# Patient Record
Sex: MALE | Race: OTHER | HISPANIC OR LATINO | URBAN - METROPOLITAN AREA
[De-identification: names, ages, dates, MRNs, and addresses within clinical notes are randomized per-mention and may not be internally consistent; named-entity substitution may affect disease eponyms.]

---

## 2023-03-13 ENCOUNTER — INPATIENT (INPATIENT)
Facility: HOSPITAL | Age: 53
LOS: 3 days | Discharge: HOME | DRG: 392 | End: 2023-03-17
Attending: SURGERY | Admitting: SURGERY
Payer: MEDICAID

## 2023-03-13 VITALS
TEMPERATURE: 98 F | SYSTOLIC BLOOD PRESSURE: 158 MMHG | OXYGEN SATURATION: 95 % | DIASTOLIC BLOOD PRESSURE: 99 MMHG | HEART RATE: 100 BPM | RESPIRATION RATE: 16 BRPM | WEIGHT: 195.99 LBS

## 2023-03-13 DIAGNOSIS — K57.20 DIVERTICULITIS OF LARGE INTESTINE WITH PERFORATION AND ABSCESS WITHOUT BLEEDING: ICD-10-CM

## 2023-03-13 DIAGNOSIS — K57.92 DIVERTICULITIS OF INTESTINE, PART UNSPECIFIED, WITHOUT PERFORATION OR ABSCESS WITHOUT BLEEDING: ICD-10-CM

## 2023-03-13 LAB
ALBUMIN SERPL ELPH-MCNC: 4 G/DL — SIGNIFICANT CHANGE UP (ref 3.5–5)
ALP SERPL-CCNC: 100 U/L — SIGNIFICANT CHANGE UP (ref 40–120)
ALT FLD-CCNC: 37 U/L DA — SIGNIFICANT CHANGE UP (ref 10–60)
ANION GAP SERPL CALC-SCNC: 5 MMOL/L — SIGNIFICANT CHANGE UP (ref 5–17)
APPEARANCE UR: CLEAR — SIGNIFICANT CHANGE UP
APTT BLD: 30.4 SEC — SIGNIFICANT CHANGE UP (ref 27.5–35.5)
AST SERPL-CCNC: 23 U/L — SIGNIFICANT CHANGE UP (ref 10–40)
BACTERIA # UR AUTO: ABNORMAL /HPF
BASOPHILS # BLD AUTO: 0.05 K/UL — SIGNIFICANT CHANGE UP (ref 0–0.2)
BASOPHILS NFR BLD AUTO: 0.3 % — SIGNIFICANT CHANGE UP (ref 0–2)
BILIRUB SERPL-MCNC: 0.9 MG/DL — SIGNIFICANT CHANGE UP (ref 0.2–1.2)
BILIRUB UR-MCNC: NEGATIVE — SIGNIFICANT CHANGE UP
BLD GP AB SCN SERPL QL: SIGNIFICANT CHANGE UP
BUN SERPL-MCNC: 11 MG/DL — SIGNIFICANT CHANGE UP (ref 7–18)
CALCIUM SERPL-MCNC: 9.5 MG/DL — SIGNIFICANT CHANGE UP (ref 8.4–10.5)
CHLORIDE SERPL-SCNC: 106 MMOL/L — SIGNIFICANT CHANGE UP (ref 96–108)
CO2 SERPL-SCNC: 25 MMOL/L — SIGNIFICANT CHANGE UP (ref 22–31)
COLOR SPEC: YELLOW — SIGNIFICANT CHANGE UP
CREAT SERPL-MCNC: 1.18 MG/DL — SIGNIFICANT CHANGE UP (ref 0.5–1.3)
DIFF PNL FLD: ABNORMAL
EGFR: 74 ML/MIN/1.73M2 — SIGNIFICANT CHANGE UP
EOSINOPHIL # BLD AUTO: 0.13 K/UL — SIGNIFICANT CHANGE UP (ref 0–0.5)
EOSINOPHIL NFR BLD AUTO: 0.8 % — SIGNIFICANT CHANGE UP (ref 0–6)
EPI CELLS # UR: SIGNIFICANT CHANGE UP /HPF
GLUCOSE SERPL-MCNC: 125 MG/DL — HIGH (ref 70–99)
GLUCOSE UR QL: NEGATIVE — SIGNIFICANT CHANGE UP
HCT VFR BLD CALC: 45 % — SIGNIFICANT CHANGE UP (ref 39–50)
HGB BLD-MCNC: 15.5 G/DL — SIGNIFICANT CHANGE UP (ref 13–17)
IMM GRANULOCYTES NFR BLD AUTO: 0.9 % — SIGNIFICANT CHANGE UP (ref 0–0.9)
INR BLD: 1.12 RATIO — SIGNIFICANT CHANGE UP (ref 0.88–1.16)
KETONES UR-MCNC: NEGATIVE — SIGNIFICANT CHANGE UP
LEUKOCYTE ESTERASE UR-ACNC: NEGATIVE — SIGNIFICANT CHANGE UP
LIDOCAIN IGE QN: 141 U/L — SIGNIFICANT CHANGE UP (ref 73–393)
LYMPHOCYTES # BLD AUTO: 1.88 K/UL — SIGNIFICANT CHANGE UP (ref 1–3.3)
LYMPHOCYTES # BLD AUTO: 11.8 % — LOW (ref 13–44)
MCHC RBC-ENTMCNC: 29.6 PG — SIGNIFICANT CHANGE UP (ref 27–34)
MCHC RBC-ENTMCNC: 34.4 GM/DL — SIGNIFICANT CHANGE UP (ref 32–36)
MCV RBC AUTO: 85.9 FL — SIGNIFICANT CHANGE UP (ref 80–100)
MONOCYTES # BLD AUTO: 1.36 K/UL — HIGH (ref 0–0.9)
MONOCYTES NFR BLD AUTO: 8.5 % — SIGNIFICANT CHANGE UP (ref 2–14)
NEUTROPHILS # BLD AUTO: 12.43 K/UL — HIGH (ref 1.8–7.4)
NEUTROPHILS NFR BLD AUTO: 77.7 % — HIGH (ref 43–77)
NITRITE UR-MCNC: NEGATIVE — SIGNIFICANT CHANGE UP
NRBC # BLD: 0 /100 WBCS — SIGNIFICANT CHANGE UP (ref 0–0)
PH UR: 7 — SIGNIFICANT CHANGE UP (ref 5–8)
PLATELET # BLD AUTO: 309 K/UL — SIGNIFICANT CHANGE UP (ref 150–400)
POTASSIUM SERPL-MCNC: 3.6 MMOL/L — SIGNIFICANT CHANGE UP (ref 3.5–5.3)
POTASSIUM SERPL-SCNC: 3.6 MMOL/L — SIGNIFICANT CHANGE UP (ref 3.5–5.3)
PROT SERPL-MCNC: 7.9 G/DL — SIGNIFICANT CHANGE UP (ref 6–8.3)
PROT UR-MCNC: 30 MG/DL
PROTHROM AB SERPL-ACNC: 13.4 SEC — SIGNIFICANT CHANGE UP (ref 10.5–13.4)
RBC # BLD: 5.24 M/UL — SIGNIFICANT CHANGE UP (ref 4.2–5.8)
RBC # FLD: 12.7 % — SIGNIFICANT CHANGE UP (ref 10.3–14.5)
RBC CASTS # UR COMP ASSIST: SIGNIFICANT CHANGE UP /HPF (ref 0–2)
SARS-COV-2 RNA SPEC QL NAA+PROBE: SIGNIFICANT CHANGE UP
SODIUM SERPL-SCNC: 136 MMOL/L — SIGNIFICANT CHANGE UP (ref 135–145)
SP GR SPEC: 1.01 — SIGNIFICANT CHANGE UP (ref 1.01–1.02)
UROBILINOGEN FLD QL: NEGATIVE — SIGNIFICANT CHANGE UP
WBC # BLD: 16 K/UL — HIGH (ref 3.8–10.5)
WBC # FLD AUTO: 16 K/UL — HIGH (ref 3.8–10.5)
WBC UR QL: SIGNIFICANT CHANGE UP /HPF (ref 0–5)

## 2023-03-13 PROCEDURE — 99285 EMERGENCY DEPT VISIT HI MDM: CPT

## 2023-03-13 PROCEDURE — 74177 CT ABD & PELVIS W/CONTRAST: CPT | Mod: 26,MA

## 2023-03-13 RX ORDER — PIPERACILLIN AND TAZOBACTAM 4; .5 G/20ML; G/20ML
3.38 INJECTION, POWDER, LYOPHILIZED, FOR SOLUTION INTRAVENOUS ONCE
Refills: 0 | Status: DISCONTINUED | OUTPATIENT
Start: 2023-03-13 | End: 2023-03-13

## 2023-03-13 RX ORDER — ONDANSETRON 8 MG/1
4 TABLET, FILM COATED ORAL EVERY 6 HOURS
Refills: 0 | Status: DISCONTINUED | OUTPATIENT
Start: 2023-03-13 | End: 2023-03-17

## 2023-03-13 RX ORDER — PIPERACILLIN AND TAZOBACTAM 4; .5 G/20ML; G/20ML
3.38 INJECTION, POWDER, LYOPHILIZED, FOR SOLUTION INTRAVENOUS EVERY 8 HOURS
Refills: 0 | Status: DISCONTINUED | OUTPATIENT
Start: 2023-03-14 | End: 2023-03-17

## 2023-03-13 RX ORDER — MORPHINE SULFATE 50 MG/1
2 CAPSULE, EXTENDED RELEASE ORAL EVERY 4 HOURS
Refills: 0 | Status: DISCONTINUED | OUTPATIENT
Start: 2023-03-13 | End: 2023-03-17

## 2023-03-13 RX ORDER — DEXTROSE MONOHYDRATE, SODIUM CHLORIDE, AND POTASSIUM CHLORIDE 50; .745; 4.5 G/1000ML; G/1000ML; G/1000ML
1000 INJECTION, SOLUTION INTRAVENOUS
Refills: 0 | Status: DISCONTINUED | OUTPATIENT
Start: 2023-03-13 | End: 2023-03-17

## 2023-03-13 RX ORDER — MORPHINE SULFATE 50 MG/1
4 CAPSULE, EXTENDED RELEASE ORAL ONCE
Refills: 0 | Status: DISCONTINUED | OUTPATIENT
Start: 2023-03-13 | End: 2023-03-13

## 2023-03-13 RX ORDER — HEPARIN SODIUM 5000 [USP'U]/ML
5000 INJECTION INTRAVENOUS; SUBCUTANEOUS EVERY 8 HOURS
Refills: 0 | Status: DISCONTINUED | OUTPATIENT
Start: 2023-03-13 | End: 2023-03-17

## 2023-03-13 RX ORDER — PIPERACILLIN AND TAZOBACTAM 4; .5 G/20ML; G/20ML
3.38 INJECTION, POWDER, LYOPHILIZED, FOR SOLUTION INTRAVENOUS ONCE
Refills: 0 | Status: COMPLETED | OUTPATIENT
Start: 2023-03-13 | End: 2023-03-13

## 2023-03-13 RX ORDER — SODIUM CHLORIDE 9 MG/ML
1000 INJECTION INTRAMUSCULAR; INTRAVENOUS; SUBCUTANEOUS ONCE
Refills: 0 | Status: COMPLETED | OUTPATIENT
Start: 2023-03-13 | End: 2023-03-13

## 2023-03-13 RX ADMIN — PIPERACILLIN AND TAZOBACTAM 3.38 GRAM(S): 4; .5 INJECTION, POWDER, LYOPHILIZED, FOR SOLUTION INTRAVENOUS at 21:17

## 2023-03-13 RX ADMIN — HEPARIN SODIUM 5000 UNIT(S): 5000 INJECTION INTRAVENOUS; SUBCUTANEOUS at 22:02

## 2023-03-13 RX ADMIN — SODIUM CHLORIDE 1000 MILLILITER(S): 9 INJECTION INTRAMUSCULAR; INTRAVENOUS; SUBCUTANEOUS at 17:01

## 2023-03-13 RX ADMIN — PIPERACILLIN AND TAZOBACTAM 200 GRAM(S): 4; .5 INJECTION, POWDER, LYOPHILIZED, FOR SOLUTION INTRAVENOUS at 20:48

## 2023-03-13 RX ADMIN — MORPHINE SULFATE 4 MILLIGRAM(S): 50 CAPSULE, EXTENDED RELEASE ORAL at 16:27

## 2023-03-13 RX ADMIN — MORPHINE SULFATE 4 MILLIGRAM(S): 50 CAPSULE, EXTENDED RELEASE ORAL at 15:57

## 2023-03-13 RX ADMIN — SODIUM CHLORIDE 1000 MILLILITER(S): 9 INJECTION INTRAMUSCULAR; INTRAVENOUS; SUBCUTANEOUS at 16:01

## 2023-03-13 NOTE — H&P ADULT - NSHPPHYSICALEXAM_GEN_ALL_CORE
T(C): 37.1 (03-13-23 @ 16:47), Max: 37.1 (03-13-23 @ 16:47)  HR: 83 (03-13-23 @ 16:47) (82 - 100)  BP: 138/86 (03-13-23 @ 16:47) (126/75 - 158/99)  RR: 16 (03-13-23 @ 16:47) (16 - 17)  SpO2: 96% (03-13-23 @ 16:47) (95% - 97%)    CONSTITUTIONAL: Well groomed, no apparent distress  EYES: PERRLA and symmetric, EOMI, No conjunctival or scleral injection, non-icteric  NECK: Supple, symmetric and without tracheal deviation   RESP: No respiratory distress, no use of accessory muscles; CTA b/l, no WRR  CV: RRR, +S1S2, no MRG  GI: Soft, focal mild LLQ tenderness, ND, no rebound, no guarding  MSK: Normal gait; No digital clubbing or cyanosis  SKIN: No rashes or ulcers noted; no subcutaneous nodules or induration palpable  PSYCH: Appropriate insight/judgment; A+O x 3, mood and affect appropriate, recent/remote memory intact

## 2023-03-13 NOTE — ED PROVIDER NOTE - CLINICAL SUMMARY MEDICAL DECISION MAKING FREE TEXT BOX
52-year-old male, presents for evaluation of lower abdominal pain x2 days.  On exam nontoxic appearance, hemodynamically stable, afebrile.  Abdomen is distended with generalized tenderness.  CT shows significant inflammation from sigmoid diverticulitis with microperforation.  No abscess.  Will start on Zosyn, surgery consulted and plan to admit to the hospital.

## 2023-03-13 NOTE — ED ADULT NURSE NOTE - OBJECTIVE STATEMENT
Pt presents to the ED with c/o lower abdominal pain and chills since yesterday. Denies fever, nausea, vomiting, or diarrhea.

## 2023-03-13 NOTE — ED PROVIDER NOTE - OBJECTIVE STATEMENT
52-year-old male, history of ?diverticulitis, presents for evaluation of lower abdominal pain since yesterday.  Gradual onset of bilateral lower abdominal pain that is continuous and has become worse today.  Associated with feeling bloated and subjective fever.  Bilateral knee joint pain.  Reports that has had similar symptoms in the past 1 year ago but unsure of the diagnosis.  Denies any urinary symptoms, rash, chest pain, shortness of breath, dizziness or any other complaint.

## 2023-03-13 NOTE — ED PROVIDER NOTE - PROGRESS NOTE DETAILS
CT report reviewed and discussed with patient. Surgery consult requested (spoke to Dr Krishnan). Will start Zosyn. Plan TBA.

## 2023-03-13 NOTE — ED PROVIDER NOTE - NS ED ATTENDING STATEMENT MOD
This was a shared visit with the MARYCHUY. I reviewed and verified the documentation and independently performed the documented:

## 2023-03-13 NOTE — H&P ADULT - HISTORY OF PRESENT ILLNESS
51 y/o male denies any sig PMH or PSH  never had colonoscopy  possible previous bout of diverticulitis; pt unsure    c/o lower abd pain L>R x 1 day  feeling better in ED now  +bloating with PO intake, subjective fever  no chills, n/v    < from: CT Abdomen and Pelvis w/ IV Cont (03.13.23 @ 19:55) >  FINDINGS:  LOWER CHEST: Motion artifact. Left lower lobe scarring. 7 mm right lower   lobe calcified granuloma.    LIVER: Mild steatosis. Subcentimeter hepatic dome hypodensity too small   to characterize.  BILE DUCTS:Normal caliber.  GALLBLADDER: Within normal limits.  SPLEEN: Within normal limits.  PANCREAS: Within normal limits.  ADRENALS: Within normal limits.  KIDNEYS/URETERS: Within normal limits.    BLADDER: Within normal limits.  REPRODUCTIVE ORGANS: Prostate within normal limits.    BOWEL: No bowel obstruction. Mild colonic stool burden. Sigmoid   diverticulosis. 12 cm length segment of thick-walled sigmoid colon with   pericolonic fat stranding consistent with diverticulitis. No intramural   or intra-abdominal abscess. Tiny droplet of extraluminal gas proximally   (3:94) consistent with microperforation. Appendix is normal.  PERITONEUM: Small ascites tracking down the left paracolic gutter into   the pelvis. No pneumoperitoneum.  VESSELS: Within normal limits.  RETROPERITONEUM/LYMPH NODES: No lymphadenopathy.  ABDOMINAL WALL: Within normal limits.  BONES: Within normal limits.    IMPRESSION:  Moderate to severe sigmoid diverticulitis with small focus of   microperforation proximally. No remote pneumoperitoneum. No intramural or   intra-abdominal abscess. Recommend follow-up to resolution and follow-up   colonoscopy to exclude underlying mass..    < end of copied text >

## 2023-03-14 LAB
ANION GAP SERPL CALC-SCNC: 6 MMOL/L — SIGNIFICANT CHANGE UP (ref 5–17)
BUN SERPL-MCNC: 9 MG/DL — SIGNIFICANT CHANGE UP (ref 7–18)
CALCIUM SERPL-MCNC: 8.5 MG/DL — SIGNIFICANT CHANGE UP (ref 8.4–10.5)
CHLORIDE SERPL-SCNC: 104 MMOL/L — SIGNIFICANT CHANGE UP (ref 96–108)
CO2 SERPL-SCNC: 26 MMOL/L — SIGNIFICANT CHANGE UP (ref 22–31)
CREAT SERPL-MCNC: 1.09 MG/DL — SIGNIFICANT CHANGE UP (ref 0.5–1.3)
EGFR: 82 ML/MIN/1.73M2 — SIGNIFICANT CHANGE UP
GLUCOSE SERPL-MCNC: 152 MG/DL — HIGH (ref 70–99)
HCT VFR BLD CALC: 41.7 % — SIGNIFICANT CHANGE UP (ref 39–50)
HGB BLD-MCNC: 13.7 G/DL — SIGNIFICANT CHANGE UP (ref 13–17)
MCHC RBC-ENTMCNC: 29.4 PG — SIGNIFICANT CHANGE UP (ref 27–34)
MCHC RBC-ENTMCNC: 32.9 GM/DL — SIGNIFICANT CHANGE UP (ref 32–36)
MCV RBC AUTO: 89.5 FL — SIGNIFICANT CHANGE UP (ref 80–100)
NRBC # BLD: 0 /100 WBCS — SIGNIFICANT CHANGE UP (ref 0–0)
PLATELET # BLD AUTO: 256 K/UL — SIGNIFICANT CHANGE UP (ref 150–400)
POTASSIUM SERPL-MCNC: 3.7 MMOL/L — SIGNIFICANT CHANGE UP (ref 3.5–5.3)
POTASSIUM SERPL-SCNC: 3.7 MMOL/L — SIGNIFICANT CHANGE UP (ref 3.5–5.3)
RBC # BLD: 4.66 M/UL — SIGNIFICANT CHANGE UP (ref 4.2–5.8)
RBC # FLD: 12.8 % — SIGNIFICANT CHANGE UP (ref 10.3–14.5)
SODIUM SERPL-SCNC: 136 MMOL/L — SIGNIFICANT CHANGE UP (ref 135–145)
WBC # BLD: 12.54 K/UL — HIGH (ref 3.8–10.5)
WBC # FLD AUTO: 12.54 K/UL — HIGH (ref 3.8–10.5)

## 2023-03-14 RX ADMIN — HEPARIN SODIUM 5000 UNIT(S): 5000 INJECTION INTRAVENOUS; SUBCUTANEOUS at 06:19

## 2023-03-14 RX ADMIN — HEPARIN SODIUM 5000 UNIT(S): 5000 INJECTION INTRAVENOUS; SUBCUTANEOUS at 13:35

## 2023-03-14 RX ADMIN — MORPHINE SULFATE 2 MILLIGRAM(S): 50 CAPSULE, EXTENDED RELEASE ORAL at 02:09

## 2023-03-14 RX ADMIN — DEXTROSE MONOHYDRATE, SODIUM CHLORIDE, AND POTASSIUM CHLORIDE 125 MILLILITER(S): 50; .745; 4.5 INJECTION, SOLUTION INTRAVENOUS at 11:35

## 2023-03-14 RX ADMIN — DEXTROSE MONOHYDRATE, SODIUM CHLORIDE, AND POTASSIUM CHLORIDE 125 MILLILITER(S): 50; .745; 4.5 INJECTION, SOLUTION INTRAVENOUS at 01:47

## 2023-03-14 RX ADMIN — PIPERACILLIN AND TAZOBACTAM 25 GRAM(S): 4; .5 INJECTION, POWDER, LYOPHILIZED, FOR SOLUTION INTRAVENOUS at 13:32

## 2023-03-14 RX ADMIN — HEPARIN SODIUM 5000 UNIT(S): 5000 INJECTION INTRAVENOUS; SUBCUTANEOUS at 21:39

## 2023-03-14 RX ADMIN — PIPERACILLIN AND TAZOBACTAM 25 GRAM(S): 4; .5 INJECTION, POWDER, LYOPHILIZED, FOR SOLUTION INTRAVENOUS at 21:39

## 2023-03-14 RX ADMIN — DEXTROSE MONOHYDRATE, SODIUM CHLORIDE, AND POTASSIUM CHLORIDE 125 MILLILITER(S): 50; .745; 4.5 INJECTION, SOLUTION INTRAVENOUS at 21:39

## 2023-03-14 RX ADMIN — MORPHINE SULFATE 2 MILLIGRAM(S): 50 CAPSULE, EXTENDED RELEASE ORAL at 01:39

## 2023-03-14 RX ADMIN — ONDANSETRON 4 MILLIGRAM(S): 8 TABLET, FILM COATED ORAL at 01:38

## 2023-03-14 NOTE — PROGRESS NOTE ADULT - SUBJECTIVE AND OBJECTIVE BOX
Pt c/o llq pain, stable    ICU Vital Signs Last 24 Hrs  T(C): 36.7 (14 Mar 2023 06:07), Max: 37.1 (13 Mar 2023 16:47)  T(F): 98.1 (14 Mar 2023 06:07), Max: 98.8 (13 Mar 2023 16:47)  HR: 65 (14 Mar 2023 06:07) (65 - 100)  BP: 134/82 (14 Mar 2023 06:07) (105/64 - 158/99)  BP(mean): 73 (14 Mar 2023 01:35) (73 - 73)  ABP: --  ABP(mean): --  RR: 18 (14 Mar 2023 06:07) (16 - 18)  SpO2: 96% (14 Mar 2023 06:07) (94% - 98%)    O2 Parameters below as of 14 Mar 2023 06:07  Patient On (Oxygen Delivery Method): room air        alert nad  Abd: soft +llq tenderness, voluntary guarding no rebound                        15.5   16.00 )-----------( 309      ( 13 Mar 2023 15:44 )             45.0   03-13    136  |  106  |  11  ----------------------------<  125<H>  3.6   |  25  |  1.18    Ca    9.5      13 Mar 2023 15:44    TPro  7.9  /  Alb  4.0  /  TBili  0.9  /  DBili  x   /  AST  23  /  ALT  37  /  AlkPhos  100  03-13

## 2023-03-15 LAB
ANION GAP SERPL CALC-SCNC: 5 MMOL/L — SIGNIFICANT CHANGE UP (ref 5–17)
BUN SERPL-MCNC: 9 MG/DL — SIGNIFICANT CHANGE UP (ref 7–18)
CALCIUM SERPL-MCNC: 8.9 MG/DL — SIGNIFICANT CHANGE UP (ref 8.4–10.5)
CHLORIDE SERPL-SCNC: 106 MMOL/L — SIGNIFICANT CHANGE UP (ref 96–108)
CO2 SERPL-SCNC: 26 MMOL/L — SIGNIFICANT CHANGE UP (ref 22–31)
CREAT SERPL-MCNC: 1.17 MG/DL — SIGNIFICANT CHANGE UP (ref 0.5–1.3)
EGFR: 75 ML/MIN/1.73M2 — SIGNIFICANT CHANGE UP
GLUCOSE SERPL-MCNC: 147 MG/DL — HIGH (ref 70–99)
HCT VFR BLD CALC: 41.9 % — SIGNIFICANT CHANGE UP (ref 39–50)
HGB BLD-MCNC: 13.8 G/DL — SIGNIFICANT CHANGE UP (ref 13–17)
MCHC RBC-ENTMCNC: 29.3 PG — SIGNIFICANT CHANGE UP (ref 27–34)
MCHC RBC-ENTMCNC: 32.9 GM/DL — SIGNIFICANT CHANGE UP (ref 32–36)
MCV RBC AUTO: 89 FL — SIGNIFICANT CHANGE UP (ref 80–100)
NRBC # BLD: 0 /100 WBCS — SIGNIFICANT CHANGE UP (ref 0–0)
PLATELET # BLD AUTO: 272 K/UL — SIGNIFICANT CHANGE UP (ref 150–400)
POTASSIUM SERPL-MCNC: 3.8 MMOL/L — SIGNIFICANT CHANGE UP (ref 3.5–5.3)
POTASSIUM SERPL-SCNC: 3.8 MMOL/L — SIGNIFICANT CHANGE UP (ref 3.5–5.3)
RBC # BLD: 4.71 M/UL — SIGNIFICANT CHANGE UP (ref 4.2–5.8)
RBC # FLD: 12.3 % — SIGNIFICANT CHANGE UP (ref 10.3–14.5)
SODIUM SERPL-SCNC: 137 MMOL/L — SIGNIFICANT CHANGE UP (ref 135–145)
WBC # BLD: 8.35 K/UL — SIGNIFICANT CHANGE UP (ref 3.8–10.5)
WBC # FLD AUTO: 8.35 K/UL — SIGNIFICANT CHANGE UP (ref 3.8–10.5)

## 2023-03-15 RX ADMIN — HEPARIN SODIUM 5000 UNIT(S): 5000 INJECTION INTRAVENOUS; SUBCUTANEOUS at 13:04

## 2023-03-15 RX ADMIN — PIPERACILLIN AND TAZOBACTAM 25 GRAM(S): 4; .5 INJECTION, POWDER, LYOPHILIZED, FOR SOLUTION INTRAVENOUS at 21:48

## 2023-03-15 RX ADMIN — PIPERACILLIN AND TAZOBACTAM 25 GRAM(S): 4; .5 INJECTION, POWDER, LYOPHILIZED, FOR SOLUTION INTRAVENOUS at 13:04

## 2023-03-15 RX ADMIN — PIPERACILLIN AND TAZOBACTAM 25 GRAM(S): 4; .5 INJECTION, POWDER, LYOPHILIZED, FOR SOLUTION INTRAVENOUS at 05:03

## 2023-03-15 RX ADMIN — HEPARIN SODIUM 5000 UNIT(S): 5000 INJECTION INTRAVENOUS; SUBCUTANEOUS at 05:03

## 2023-03-15 NOTE — CONSULT NOTE ADULT - ASSESSMENT
Acute diverticulitis with microperforation  Leukocytosis - normalized    Plan - Cont Zosyn 3.375gmsiv q8hrs  repeat CT abdomen/ pelvis with oral contrast tomorrow   if perforation sealed will advance diet and plan to DC home on Ceftin / Flagyl po in 1-2 days.

## 2023-03-15 NOTE — PROGRESS NOTE ADULT - SUBJECTIVE AND OBJECTIVE BOX
INTERVAL HPI/OVERNIGHT EVENTS:  Pt resting comfortably in bed. No acute overnight events. Reports mild LLQ pain that is improved since time of presentation. C/o hunger. Denies nausea, vomiting, fevers, chills.         MEDICATIONS  (STANDING):  dextrose 5% + sodium chloride 0.45% with potassium chloride 20 mEq/L 1000 milliLiter(s) (125 mL/Hr) IV Continuous <Continuous>  heparin   Injectable 5000 Unit(s) SubCutaneous every 8 hours  piperacillin/tazobactam IVPB.. 3.375 Gram(s) IV Intermittent every 8 hours    MEDICATIONS  (PRN):  morphine  - Injectable 2 milliGRAM(s) IV Push every 4 hours PRN Moderate Pain (4 - 6)  ondansetron Injectable 4 milliGRAM(s) IV Push every 6 hours PRN Nausea and/or Vomiting      Vital Signs Last 24 Hrs  T(C): 36.5 (15 Mar 2023 05:50), Max: 36.7 (14 Mar 2023 21:21)  T(F): 97.7 (15 Mar 2023 05:50), Max: 98 (14 Mar 2023 21:21)  HR: 53 (15 Mar 2023 05:50) (53 - 73)  BP: 108/60 (15 Mar 2023 05:50) (108/60 - 112/68)  BP(mean): 72 (15 Mar 2023 05:50) (72 - 72)  RR: 16 (15 Mar 2023 05:50) (16 - 16)  SpO2: 95% (15 Mar 2023 05:50) (95% - 98%)    Parameters below as of 15 Mar 2023 05:50  Patient On (Oxygen Delivery Method): room air        Physical:  General: A&Ox3. NAD.  Respiratory: Normal respiratory effort. Equal chest rise bilaterally.  Abdomen: Soft, nondistended, mild TTP in LLQ. No guarding, no rigidity, no rebound tenderness. No palpable masses.    I&O's Detail      LABS:                        13.8   8.35  )-----------( 272      ( 15 Mar 2023 07:00 )             41.9             03-15    137  |  106  |  9   ----------------------------<  147<H>  3.8   |  26  |  1.17    Ca    8.9      15 Mar 2023 07:00    TPro  7.9  /  Alb  4.0  /  TBili  0.9  /  DBili  x   /  AST  23  /  ALT  37  /  AlkPhos  100  03-13      RADIOLOGY  ACC: [id number] EXAM:  CT ABDOMEN AND PELVIS IC   ORDERED BY:  CHRISTINA VOSS     PROCEDURE DATE:  03/13/2023          INTERPRETATION:  CLINICAL INFORMATION: 52 years  Male with lower abdo   pain, r/o diverticulitis/perforation.    COMPARISON: None.    CONTRAST/COMPLICATIONS:  IV Contrast: Omnipaque 350  90 cc administered   10 cc discarded  Oral Contrast: NONE  Complications: None reported at time of study completion    PROCEDURE:  CT of the Abdomen and Pelvis was performed.  Sagittal and coronal reformats were performed.    FINDINGS:  LOWER CHEST: Motion artifact. Left lower lobe scarring. 7 mm right lower   lobe calcified granuloma.    LIVER: Mild steatosis. Subcentimeter hepatic dome hypodensity too small   to characterize.  BILE DUCTS:Normal caliber.  GALLBLADDER: Within normal limits.  SPLEEN: Within normal limits.  PANCREAS: Within normal limits.  ADRENALS: Within normal limits.  KIDNEYS/URETERS: Within normal limits.    BLADDER: Within normal limits.  REPRODUCTIVE ORGANS: Prostate within normal limits.    BOWEL: No bowel obstruction. Mild colonic stool burden. Sigmoid   diverticulosis. 12 cm length segment of thick-walled sigmoid colon with   pericolonic fat stranding consistent with diverticulitis. No intramural   or intra-abdominal abscess. Tiny droplet of extraluminal gas proximally   (3:94) consistent with microperforation. Appendix is normal.  PERITONEUM: Small ascites tracking down the left paracolic gutter into   the pelvis. No pneumoperitoneum.  VESSELS: Within normal limits.  RETROPERITONEUM/LYMPH NODES: No lymphadenopathy.  ABDOMINAL WALL: Within normal limits.  BONES: Within normal limits.    IMPRESSION:  Moderate to severe sigmoid diverticulitis with small focus of   microperforation proximally. No remote pneumoperitoneum. No intramural or   intra-abdominal abscess. Recommend follow-up to resolution and follow-up   colonoscopy to exclude underlying mass..    Findings were discussed with DAVID VOSS [phone] 3/13/2023 8:23   PM by Dr. Monse Mccormick with read back confirmation.    --- End of Report ---            MONSE MCCORMICK MD; Attending Radiologist  This document has been electronically signed. Mar 13 2023  8:26PM

## 2023-03-15 NOTE — CONSULT NOTE ADULT - SUBJECTIVE AND OBJECTIVE BOX
HPI:  53 y/o male denies any sig PMH or PSH  never had colonoscopy  possible previous bout of diverticulitis; pt unsure    c/o lower abd pain L>R x 1 day  feeling better in ED now  +bloating with PO intake, subjective fever  no chills, n/v    < from: CT Abdomen and Pelvis w/ IV Cont (23 @ 19:55) >  FINDINGS:  LOWER CHEST: Motion artifact. Left lower lobe scarring. 7 mm right lower   lobe calcified granuloma.    LIVER: Mild steatosis. Subcentimeter hepatic dome hypodensity too small   to characterize.  BILE DUCTS:Normal caliber.  GALLBLADDER: Within normal limits.  SPLEEN: Within normal limits.  PANCREAS: Within normal limits.  ADRENALS: Within normal limits.  KIDNEYS/URETERS: Within normal limits.    BLADDER: Within normal limits.  REPRODUCTIVE ORGANS: Prostate within normal limits.    BOWEL: No bowel obstruction. Mild colonic stool burden. Sigmoid   diverticulosis. 12 cm length segment of thick-walled sigmoid colon with   pericolonic fat stranding consistent with diverticulitis. No intramural   or intra-abdominal abscess. Tiny droplet of extraluminal gas proximally   (3:94) consistent with microperforation. Appendix is normal.  PERITONEUM: Small ascites tracking down the left paracolic gutter into   the pelvis. No pneumoperitoneum.  VESSELS: Within normal limits.  RETROPERITONEUM/LYMPH NODES: No lymphadenopathy.  ABDOMINAL WALL: Within normal limits.  BONES: Within normal limits.    IMPRESSION:  Moderate to severe sigmoid diverticulitis with small focus of   microperforation proximally. No remote pneumoperitoneum. No intramural or   intra-abdominal abscess. Recommend follow-up to resolution and follow-up   colonoscopy to exclude underlying mass..    < end of copied text >   (13 Mar 2023 21:39)      PAST MEDICAL & SURGICAL HISTORY:  No pertinent past medical history      No significant past surgical history          No Known Allergies      Meds:  dextrose 5% + sodium chloride 0.45% with potassium chloride 20 mEq/L 1000 milliLiter(s) IV Continuous <Continuous>  heparin   Injectable 5000 Unit(s) SubCutaneous every 8 hours  morphine  - Injectable 2 milliGRAM(s) IV Push every 4 hours PRN  ondansetron Injectable 4 milliGRAM(s) IV Push every 6 hours PRN  piperacillin/tazobactam IVPB.. 3.375 Gram(s) IV Intermittent every 8 hours      SOCIAL HISTORY:  Smoker:  YES / NO        PACK YEARS:                         WHEN QUIT?  ETOH use:  YES / NO               FREQUENCY / QUANTITY:  Ilicit Drug use:  YES / NO  Occupation:  Assisted device use (Cane / Walker):  Live with:    FAMILY HISTORY:      VITALS:  Vital Signs Last 24 Hrs  T(C): 36.6 (15 Mar 2023 13:14), Max: 36.7 (14 Mar 2023 21:21)  T(F): 97.9 (15 Mar 2023 13:14), Max: 98 (14 Mar 2023 21:21)  HR: 58 (15 Mar 2023 13:14) (53 - 63)  BP: 110/68 (15 Mar 2023 13:14) (108/60 - 112/68)  BP(mean): 72 (15 Mar 2023 05:50) (72 - 72)  RR: 18 (15 Mar 2023 13:14) (16 - 18)  SpO2: 97% (15 Mar 2023 13:14) (95% - 98%)    Parameters below as of 15 Mar 2023 13:14  Patient On (Oxygen Delivery Method): room air        LABS/DIAGNOSTIC TESTS:                          13.8   8.35  )-----------( 272      ( 15 Mar 2023 07:00 )             41.9     WBC Count: 8.35 K/uL (03-15 @ 07:00)  WBC Count: 12.54 K/uL ( @ 09:02)  WBC Count: 16.00 K/uL ( @ 15:44)      03-15    137  |  106  |  9   ----------------------------<  147<H>  3.8   |  26  |  1.17    Ca    8.9      15 Mar 2023 07:00    TPro  7.9  /  Alb  4.0  /  TBili  0.9  /  DBili  x   /  AST  23  /  ALT  37  /  AlkPhos  100        Urinalysis Basic - ( 13 Mar 2023 15:44 )    Color: Yellow / Appearance: Clear / S.010 / pH: x  Gluc: x / Ketone: Negative  / Bili: Negative / Urobili: Negative   Blood: x / Protein: 30 mg/dL / Nitrite: Negative   Leuk Esterase: Negative / RBC: 0-2 /HPF / WBC 0-2 /HPF   Sq Epi: x / Non Sq Epi: Few /HPF / Bacteria: Few /HPF        LIVER FUNCTIONS - ( 13 Mar 2023 15:44 )  Alb: 4.0 g/dL / Pro: 7.9 g/dL / ALK PHOS: 100 U/L / ALT: 37 U/L DA / AST: 23 U/L / GGT: x             PT/INR - ( 13 Mar 2023 15:44 )   PT: 13.4 sec;   INR: 1.12 ratio         PTT - ( 13 Mar 2023 15:44 )  PTT:30.4 sec    LACTATE:    ABG -     CULTURES:       RADIOLOGY:< from: CT Abdomen and Pelvis w/ IV Cont (23 @ 19:55) >  ACC: [id number] EXAM:  CT ABDOMEN AND PELVIS IC   ORDERED BY:  CHRISTINA VOSS     PROCEDURE DATE:  2023          INTERPRETATION:  CLINICAL INFORMATION: 52 years  Male with lower abdo   pain, r/o diverticulitis/perforation.    COMPARISON: None.    CONTRAST/COMPLICATIONS:  IV Contrast: Omnipaque 350  90 cc administered   10 cc discarded  Oral Contrast: NONE  Complications: None reported at time of study completion    PROCEDURE:  CT of the Abdomen and Pelvis was performed.  Sagittal and coronal reformats were performed.    FINDINGS:  LOWER CHEST: Motion artifact. Left lower lobe scarring. 7 mm right lower   lobe calcified granuloma.    LIVER: Mild steatosis. Subcentimeter hepatic dome hypodensity too small   to characterize.  BILE DUCTS:Normal caliber.  GALLBLADDER: Within normal limits.  SPLEEN: Within normal limits.  PANCREAS: Within normal limits.  ADRENALS: Within normal limits.  KIDNEYS/URETERS: Within normal limits.    BLADDER: Within normal limits.  REPRODUCTIVE ORGANS: Prostate within normal limits.    BOWEL: No bowel obstruction. Mild colonic stool burden. Sigmoid   diverticulosis. 12 cm length segment of thick-walled sigmoid colon with   pericolonic fat stranding consistent with diverticulitis. No intramural   or intra-abdominal abscess. Tiny droplet of extraluminal gas proximally   (3:94) consistent with microperforation. Appendix is normal.  PERITONEUM: Small ascites tracking down the left paracolic gutter into   the pelvis. No pneumoperitoneum.  VESSELS: Within normal limits.  RETROPERITONEUM/LYMPH NODES: No lymphadenopathy.  ABDOMINAL WALL: Within normal limits.  BONES: Within normal limits.    IMPRESSION:  Moderate to severe sigmoid diverticulitis with small focus of   microperforation proximally. No remote pneumoperitoneum. No intramural or   intra-abdominal abscess. Recommend follow-up to resolution and follow-up   colonoscopy to exclude underlying mass..    Findings were discussed with NP CHRISTINA VOSS [phone] 3/13/2023 8:23   PM by Dr. Monse Mccormick with read back confirmation.    --- End of Report ---            MONSE MCCORMICK MD; Attending Radiologist  This document has been electronically signed. Mar 13 2023  8:26PM    < end of copied text >        ROS  [  ] UNABLE TO ELICIT               HPI:  51 y/o male denies any sig PMH or PSH  never had colonoscopy  possible previous bout of diverticulitis; pt unsure    c/o lower abd pain L>R x 1 day  feeling better in ED now  +bloating with PO intake, subjective fever  no chills, n/v      History as above, asked to see this patient who presented with LLQ abdominal pain x 1 day, he denies any fevers , chills, nausea , vomiting or diarrhea, no urinary symptoms. He was found to have acute Diverticulitis with a microperforation and a small droplet of air here. He states that he is already  feeling better since being here.        PAST MEDICAL & SURGICAL HISTORY:  No pertinent past medical history      No significant past surgical history          No Known Allergies      Meds:  dextrose 5% + sodium chloride 0.45% with potassium chloride 20 mEq/L 1000 milliLiter(s) IV Continuous <Continuous>  heparin   Injectable 5000 Unit(s) SubCutaneous every 8 hours  morphine  - Injectable 2 milliGRAM(s) IV Push every 4 hours PRN  ondansetron Injectable 4 milliGRAM(s) IV Push every 6 hours PRN  piperacillin/tazobactam IVPB.. 3.375 Gram(s) IV Intermittent every 8 hours      SOCIAL HISTORY:  Smoker:  no  ETOH use:  no  Ilicit Drug use: no      FAMILY HISTORY: not contributory      VITALS:  Vital Signs Last 24 Hrs  T(C): 36.6 (15 Mar 2023 13:14), Max: 36.7 (14 Mar 2023 21:21)  T(F): 97.9 (15 Mar 2023 13:14), Max: 98 (14 Mar 2023 21:21)  HR: 58 (15 Mar 2023 13:14) (53 - 63)  BP: 110/68 (15 Mar 2023 13:14) (108/60 - 112/68)  BP(mean): 72 (15 Mar 2023 05:50) (72 - 72)  RR: 18 (15 Mar 2023 13:14) (16 - 18)  SpO2: 97% (15 Mar 2023 13:14) (95% - 98%)    Parameters below as of 15 Mar 2023 13:14  Patient On (Oxygen Delivery Method): room air        LABS/DIAGNOSTIC TESTS:                          13.8   8.35  )-----------( 272      ( 15 Mar 2023 07:00 )             41.9     WBC Count: 8.35 K/uL (03-15 @ 07:00)  WBC Count: 12.54 K/uL ( @ 09:02)  WBC Count: 16.00 K/uL ( @ 15:44)      03-15    137  |  106  |  9   ----------------------------<  147<H>  3.8   |  26  |  1.17    Ca    8.9      15 Mar 2023 07:00    TPro  7.9  /  Alb  4.0  /  TBili  0.9  /  DBili  x   /  AST  23  /  ALT  37  /  AlkPhos  100        Urinalysis Basic - ( 13 Mar 2023 15:44 )    Color: Yellow / Appearance: Clear / S.010 / pH: x  Gluc: x / Ketone: Negative  / Bili: Negative / Urobili: Negative   Blood: x / Protein: 30 mg/dL / Nitrite: Negative   Leuk Esterase: Negative / RBC: 0-2 /HPF / WBC 0-2 /HPF   Sq Epi: x / Non Sq Epi: Few /HPF / Bacteria: Few /HPF        LIVER FUNCTIONS - ( 13 Mar 2023 15:44 )  Alb: 4.0 g/dL / Pro: 7.9 g/dL / ALK PHOS: 100 U/L / ALT: 37 U/L DA / AST: 23 U/L / GGT: x             PT/INR - ( 13 Mar 2023 15:44 )   PT: 13.4 sec;   INR: 1.12 ratio         PTT - ( 13 Mar 2023 15:44 )  PTT:30.4 sec    LACTATE:    ABG -     CULTURES:       RADIOLOGY:< from: CT Abdomen and Pelvis w/ IV Cont (23 @ 19:55) >  ACC: [id number] EXAM:  CT ABDOMEN AND PELVIS IC   ORDERED BY:  CHRISTINA VOSS     PROCEDURE DATE:  2023          INTERPRETATION:  CLINICAL INFORMATION: 52 years  Male with lower abdo   pain, r/o diverticulitis/perforation.    COMPARISON: None.    CONTRAST/COMPLICATIONS:  IV Contrast: Omnipaque 350  90 cc administered   10 cc discarded  Oral Contrast: NONE  Complications: None reported at time of study completion    PROCEDURE:  CT of the Abdomen and Pelvis was performed.  Sagittal and coronal reformats were performed.    FINDINGS:  LOWER CHEST: Motion artifact. Left lower lobe scarring. 7 mm right lower   lobe calcified granuloma.    LIVER: Mild steatosis. Subcentimeter hepatic dome hypodensity too small   to characterize.  BILE DUCTS:Normal caliber.  GALLBLADDER: Within normal limits.  SPLEEN: Within normal limits.  PANCREAS: Within normal limits.  ADRENALS: Within normal limits.  KIDNEYS/URETERS: Within normal limits.    BLADDER: Within normal limits.  REPRODUCTIVE ORGANS: Prostate within normal limits.    BOWEL: No bowel obstruction. Mild colonic stool burden. Sigmoid   diverticulosis. 12 cm length segment of thick-walled sigmoid colon with   pericolonic fat stranding consistent with diverticulitis. No intramural   or intra-abdominal abscess. Tiny droplet of extraluminal gas proximally   (3:94) consistent with microperforation. Appendix is normal.  PERITONEUM: Small ascites tracking down the left paracolic gutter into   the pelvis. No pneumoperitoneum.  VESSELS: Within normal limits.  RETROPERITONEUM/LYMPH NODES: No lymphadenopathy.  ABDOMINAL WALL: Within normal limits.  BONES: Within normal limits.    IMPRESSION:  Moderate to severe sigmoid diverticulitis with small focus of   microperforation proximally. No remote pneumoperitoneum. No intramural or   intra-abdominal abscess. Recommend follow-up to resolution and follow-up   colonoscopy to exclude underlying mass..    Findings were discussed with NP CHRISTINA VOSS [phone] 3/13/2023 8:23   PM by Dr. Monse Mccormick with read back confirmation.    --- End of Report ---            MONSE MCCORMICK MD; Attending Radiologist  This document has been electronically signed. Mar 13 2023  8:26PM    < end of copied text >        ROS  [  ] UNABLE TO ELICIT

## 2023-03-16 LAB
ANION GAP SERPL CALC-SCNC: 7 MMOL/L — SIGNIFICANT CHANGE UP (ref 5–17)
ANION GAP SERPL CALC-SCNC: 9 MMOL/L — SIGNIFICANT CHANGE UP (ref 5–17)
BUN SERPL-MCNC: 12 MG/DL — SIGNIFICANT CHANGE UP (ref 7–18)
BUN SERPL-MCNC: 9 MG/DL — SIGNIFICANT CHANGE UP (ref 7–18)
CALCIUM SERPL-MCNC: 8.9 MG/DL — SIGNIFICANT CHANGE UP (ref 8.4–10.5)
CALCIUM SERPL-MCNC: 9.2 MG/DL — SIGNIFICANT CHANGE UP (ref 8.4–10.5)
CHLORIDE SERPL-SCNC: 104 MMOL/L — SIGNIFICANT CHANGE UP (ref 96–108)
CHLORIDE SERPL-SCNC: 105 MMOL/L — SIGNIFICANT CHANGE UP (ref 96–108)
CO2 SERPL-SCNC: 25 MMOL/L — SIGNIFICANT CHANGE UP (ref 22–31)
CO2 SERPL-SCNC: 26 MMOL/L — SIGNIFICANT CHANGE UP (ref 22–31)
CREAT SERPL-MCNC: 1.08 MG/DL — SIGNIFICANT CHANGE UP (ref 0.5–1.3)
CREAT SERPL-MCNC: 1.24 MG/DL — SIGNIFICANT CHANGE UP (ref 0.5–1.3)
EGFR: 70 ML/MIN/1.73M2 — SIGNIFICANT CHANGE UP
EGFR: 83 ML/MIN/1.73M2 — SIGNIFICANT CHANGE UP
GLUCOSE SERPL-MCNC: 105 MG/DL — HIGH (ref 70–99)
GLUCOSE SERPL-MCNC: 164 MG/DL — HIGH (ref 70–99)
HCT VFR BLD CALC: 41.6 % — SIGNIFICANT CHANGE UP (ref 39–50)
HGB BLD-MCNC: 14.2 G/DL — SIGNIFICANT CHANGE UP (ref 13–17)
MCHC RBC-ENTMCNC: 29.5 PG — SIGNIFICANT CHANGE UP (ref 27–34)
MCHC RBC-ENTMCNC: 34.1 GM/DL — SIGNIFICANT CHANGE UP (ref 32–36)
MCV RBC AUTO: 86.5 FL — SIGNIFICANT CHANGE UP (ref 80–100)
NRBC # BLD: 0 /100 WBCS — SIGNIFICANT CHANGE UP (ref 0–0)
PLATELET # BLD AUTO: 295 K/UL — SIGNIFICANT CHANGE UP (ref 150–400)
POTASSIUM SERPL-MCNC: 3.8 MMOL/L — SIGNIFICANT CHANGE UP (ref 3.5–5.3)
POTASSIUM SERPL-MCNC: 3.8 MMOL/L — SIGNIFICANT CHANGE UP (ref 3.5–5.3)
POTASSIUM SERPL-SCNC: 3.8 MMOL/L — SIGNIFICANT CHANGE UP (ref 3.5–5.3)
POTASSIUM SERPL-SCNC: 3.8 MMOL/L — SIGNIFICANT CHANGE UP (ref 3.5–5.3)
RBC # BLD: 4.81 M/UL — SIGNIFICANT CHANGE UP (ref 4.2–5.8)
RBC # FLD: 12.4 % — SIGNIFICANT CHANGE UP (ref 10.3–14.5)
SODIUM SERPL-SCNC: 136 MMOL/L — SIGNIFICANT CHANGE UP (ref 135–145)
SODIUM SERPL-SCNC: 140 MMOL/L — SIGNIFICANT CHANGE UP (ref 135–145)
WBC # BLD: 7.35 K/UL — SIGNIFICANT CHANGE UP (ref 3.8–10.5)
WBC # FLD AUTO: 7.35 K/UL — SIGNIFICANT CHANGE UP (ref 3.8–10.5)

## 2023-03-16 PROCEDURE — 74177 CT ABD & PELVIS W/CONTRAST: CPT | Mod: 26

## 2023-03-16 RX ORDER — SODIUM CHLORIDE 9 MG/ML
1000 INJECTION, SOLUTION INTRAVENOUS ONCE
Refills: 0 | Status: COMPLETED | OUTPATIENT
Start: 2023-03-16 | End: 2023-03-16

## 2023-03-16 RX ORDER — IOHEXOL 300 MG/ML
30 INJECTION, SOLUTION INTRAVENOUS ONCE
Refills: 0 | Status: COMPLETED | OUTPATIENT
Start: 2023-03-16 | End: 2023-03-16

## 2023-03-16 RX ADMIN — PIPERACILLIN AND TAZOBACTAM 25 GRAM(S): 4; .5 INJECTION, POWDER, LYOPHILIZED, FOR SOLUTION INTRAVENOUS at 13:23

## 2023-03-16 RX ADMIN — HEPARIN SODIUM 5000 UNIT(S): 5000 INJECTION INTRAVENOUS; SUBCUTANEOUS at 05:19

## 2023-03-16 RX ADMIN — HEPARIN SODIUM 5000 UNIT(S): 5000 INJECTION INTRAVENOUS; SUBCUTANEOUS at 22:18

## 2023-03-16 RX ADMIN — SODIUM CHLORIDE 1000 MILLILITER(S): 9 INJECTION, SOLUTION INTRAVENOUS at 11:01

## 2023-03-16 RX ADMIN — PIPERACILLIN AND TAZOBACTAM 25 GRAM(S): 4; .5 INJECTION, POWDER, LYOPHILIZED, FOR SOLUTION INTRAVENOUS at 22:18

## 2023-03-16 RX ADMIN — HEPARIN SODIUM 5000 UNIT(S): 5000 INJECTION INTRAVENOUS; SUBCUTANEOUS at 13:25

## 2023-03-16 RX ADMIN — IOHEXOL 30 MILLILITER(S): 300 INJECTION, SOLUTION INTRAVENOUS at 14:28

## 2023-03-16 RX ADMIN — PIPERACILLIN AND TAZOBACTAM 25 GRAM(S): 4; .5 INJECTION, POWDER, LYOPHILIZED, FOR SOLUTION INTRAVENOUS at 05:17

## 2023-03-16 NOTE — PROGRESS NOTE ADULT - SUBJECTIVE AND OBJECTIVE BOX
INTERVAL HPI/OVERNIGHT EVENTS:  Pt resting comfortably in bed. No acute overnight events. Tolerating clear liquid diet. No acute complaints. Denies abdominal pain, nausea, vomiting, fevers, chills. + flatus.        MEDICATIONS  (STANDING):  dextrose 5% + sodium chloride 0.45% with potassium chloride 20 mEq/L 1000 milliLiter(s) (125 mL/Hr) IV Continuous <Continuous>  heparin   Injectable 5000 Unit(s) SubCutaneous every 8 hours  piperacillin/tazobactam IVPB.. 3.375 Gram(s) IV Intermittent every 8 hours    MEDICATIONS  (PRN):  morphine  - Injectable 2 milliGRAM(s) IV Push every 4 hours PRN Moderate Pain (4 - 6)  ondansetron Injectable 4 milliGRAM(s) IV Push every 6 hours PRN Nausea and/or Vomiting      Vital Signs Last 24 Hrs  T(C): 36.6 (16 Mar 2023 05:51), Max: 36.6 (15 Mar 2023 13:14)  T(F): 97.8 (16 Mar 2023 05:51), Max: 97.9 (15 Mar 2023 13:14)  HR: 57 (16 Mar 2023 05:51) (57 - 61)  BP: 117/63 (16 Mar 2023 05:51) (110/68 - 117/63)  BP(mean): --  RR: 18 (16 Mar 2023 05:51) (18 - 18)  SpO2: 96% (16 Mar 2023 05:51) (96% - 97%)    Parameters below as of 16 Mar 2023 05:51  Patient On (Oxygen Delivery Method): room air        Physical:  General: A&Ox3. NAD.  Cardio: regular rate  Respiratory: Normal respiratory effort. Equal chest rise bilaterally.  Abdomen: Soft, nondistended, minimal TTP in LLQ. No guarding, no rebound tenderness.    I&O's Detail    LABS:                        14.2   7.35  )-----------( 295      ( 16 Mar 2023 07:26 )             41.6             03-16    136  |  104  |  9   ----------------------------<  105<H>  3.8   |  25  |  1.24    Ca    9.2      16 Mar 2023 07:26

## 2023-03-16 NOTE — PROGRESS NOTE ADULT - NS ATTEND OPT1 GEN_ALL_CORE
I independently performed the documented:
I independently performed the documented:
PCP for Immediate Care

## 2023-03-16 NOTE — PROGRESS NOTE ADULT - NS ATTEND AMEND GEN_ALL_CORE FT
Pt seen and examined. Reports pain improved. Tolerating clears. Passing flatus. Reported some dysuria with urination and sensation unable to empty fully. RN bladder scanned- showed 244. Post void scan showed < 10 cc fluid so able to void without issues.  Gen- NAD. Abd- Soft, non distended. Mild LLQ tenderness, no guarding no rebound.  For repeat CT today. Further management pending results.
Pt seen and examined. Reports feels better.  States had a BM today. Labs reviewed, WBC nml.   Gen- NAD. Abd- soft, mild LLQ and suprapubic tenderness. No guarding, no rebound  Cont IV abx. If remains stable, will advance diet in AM.

## 2023-03-16 NOTE — PROGRESS NOTE ADULT - NSPROGADDITIONALINFOA_GEN_ALL_CORE
Pt seen and examined. Reports feels better.  States had a BM today. Labs reviewed, WBC nml.   Gen- NAD. Abd- soft, mild LLQ and suprapubic tenderness. No guarding, no rebound  Cont IV abx. If remains stable, will advance diet in AM.
Pt seen and examined. Reports pain improved. Tolerating clears. Passing flatus. Reported some dysuria with urination and sensation unable to empty fully. RN bladder scanned- showed 244. Post void scan showed < 10 cc fluid so able to void without issues.  Gen- NAD. Abd- Soft, non distended. Mild LLQ tenderness, no guarding no rebound.  For repeat CT today. Further management pending results.

## 2023-03-17 VITALS
RESPIRATION RATE: 16 BRPM | SYSTOLIC BLOOD PRESSURE: 127 MMHG | HEART RATE: 83 BPM | DIASTOLIC BLOOD PRESSURE: 73 MMHG | TEMPERATURE: 98 F | OXYGEN SATURATION: 95 %

## 2023-03-17 LAB
ANION GAP SERPL CALC-SCNC: 8 MMOL/L — SIGNIFICANT CHANGE UP (ref 5–17)
BUN SERPL-MCNC: 10 MG/DL — SIGNIFICANT CHANGE UP (ref 7–18)
CALCIUM SERPL-MCNC: 9.4 MG/DL — SIGNIFICANT CHANGE UP (ref 8.4–10.5)
CHLORIDE SERPL-SCNC: 104 MMOL/L — SIGNIFICANT CHANGE UP (ref 96–108)
CO2 SERPL-SCNC: 24 MMOL/L — SIGNIFICANT CHANGE UP (ref 22–31)
CREAT SERPL-MCNC: 1.2 MG/DL — SIGNIFICANT CHANGE UP (ref 0.5–1.3)
EGFR: 73 ML/MIN/1.73M2 — SIGNIFICANT CHANGE UP
GLUCOSE SERPL-MCNC: 91 MG/DL — SIGNIFICANT CHANGE UP (ref 70–99)
HCT VFR BLD CALC: 42.5 % — SIGNIFICANT CHANGE UP (ref 39–50)
HGB BLD-MCNC: 14.5 G/DL — SIGNIFICANT CHANGE UP (ref 13–17)
MCHC RBC-ENTMCNC: 29.7 PG — SIGNIFICANT CHANGE UP (ref 27–34)
MCHC RBC-ENTMCNC: 34.1 GM/DL — SIGNIFICANT CHANGE UP (ref 32–36)
MCV RBC AUTO: 87.1 FL — SIGNIFICANT CHANGE UP (ref 80–100)
NRBC # BLD: 0 /100 WBCS — SIGNIFICANT CHANGE UP (ref 0–0)
PLATELET # BLD AUTO: 331 K/UL — SIGNIFICANT CHANGE UP (ref 150–400)
POTASSIUM SERPL-MCNC: 3.8 MMOL/L — SIGNIFICANT CHANGE UP (ref 3.5–5.3)
POTASSIUM SERPL-SCNC: 3.8 MMOL/L — SIGNIFICANT CHANGE UP (ref 3.5–5.3)
RBC # BLD: 4.88 M/UL — SIGNIFICANT CHANGE UP (ref 4.2–5.8)
RBC # FLD: 12.2 % — SIGNIFICANT CHANGE UP (ref 10.3–14.5)
SODIUM SERPL-SCNC: 136 MMOL/L — SIGNIFICANT CHANGE UP (ref 135–145)
WBC # BLD: 6.76 K/UL — SIGNIFICANT CHANGE UP (ref 3.8–10.5)
WBC # FLD AUTO: 6.76 K/UL — SIGNIFICANT CHANGE UP (ref 3.8–10.5)

## 2023-03-17 PROCEDURE — 85730 THROMBOPLASTIN TIME PARTIAL: CPT

## 2023-03-17 PROCEDURE — 85610 PROTHROMBIN TIME: CPT

## 2023-03-17 PROCEDURE — 99285 EMERGENCY DEPT VISIT HI MDM: CPT | Mod: 25

## 2023-03-17 PROCEDURE — 85025 COMPLETE CBC W/AUTO DIFF WBC: CPT

## 2023-03-17 PROCEDURE — 99238 HOSP IP/OBS DSCHRG MGMT 30/<: CPT

## 2023-03-17 PROCEDURE — 74177 CT ABD & PELVIS W/CONTRAST: CPT

## 2023-03-17 PROCEDURE — 86901 BLOOD TYPING SEROLOGIC RH(D): CPT

## 2023-03-17 PROCEDURE — 85027 COMPLETE CBC AUTOMATED: CPT

## 2023-03-17 PROCEDURE — 96375 TX/PRO/DX INJ NEW DRUG ADDON: CPT

## 2023-03-17 PROCEDURE — 86900 BLOOD TYPING SEROLOGIC ABO: CPT

## 2023-03-17 PROCEDURE — 81001 URINALYSIS AUTO W/SCOPE: CPT

## 2023-03-17 PROCEDURE — 36415 COLL VENOUS BLD VENIPUNCTURE: CPT

## 2023-03-17 PROCEDURE — 80053 COMPREHEN METABOLIC PANEL: CPT

## 2023-03-17 PROCEDURE — 83690 ASSAY OF LIPASE: CPT

## 2023-03-17 PROCEDURE — 87635 SARS-COV-2 COVID-19 AMP PRB: CPT

## 2023-03-17 PROCEDURE — 96365 THER/PROPH/DIAG IV INF INIT: CPT

## 2023-03-17 PROCEDURE — 86850 RBC ANTIBODY SCREEN: CPT

## 2023-03-17 PROCEDURE — 80048 BASIC METABOLIC PNL TOTAL CA: CPT

## 2023-03-17 RX ORDER — CIPROFLOXACIN LACTATE 400MG/40ML
1 VIAL (ML) INTRAVENOUS
Qty: 20 | Refills: 0
Start: 2023-03-17 | End: 2023-03-26

## 2023-03-17 RX ORDER — DOCUSATE SODIUM 100 MG
1 CAPSULE ORAL
Qty: 60 | Refills: 0
Start: 2023-03-17 | End: 2023-04-15

## 2023-03-17 RX ORDER — METRONIDAZOLE 500 MG
1 TABLET ORAL
Qty: 30 | Refills: 0
Start: 2023-03-17 | End: 2023-03-26

## 2023-03-17 RX ADMIN — DEXTROSE MONOHYDRATE, SODIUM CHLORIDE, AND POTASSIUM CHLORIDE 125 MILLILITER(S): 50; .745; 4.5 INJECTION, SOLUTION INTRAVENOUS at 06:14

## 2023-03-17 RX ADMIN — HEPARIN SODIUM 5000 UNIT(S): 5000 INJECTION INTRAVENOUS; SUBCUTANEOUS at 06:07

## 2023-03-17 RX ADMIN — PIPERACILLIN AND TAZOBACTAM 25 GRAM(S): 4; .5 INJECTION, POWDER, LYOPHILIZED, FOR SOLUTION INTRAVENOUS at 06:07

## 2023-03-17 NOTE — DISCHARGE NOTE PROVIDER - NSDCCPCAREPLAN_GEN_ALL_CORE_FT
PRINCIPAL DISCHARGE DIAGNOSIS  Diagnosis: Diverticulitis of colon with perforation  Assessment and Plan of Treatment: Continue low fiber diet until follow up visit.  Take ciprofloxacin and metronidazole (flagyl) for 10 days.

## 2023-03-17 NOTE — DISCHARGE NOTE NURSING/CASE MANAGEMENT/SOCIAL WORK - PATIENT PORTAL LINK FT
You can access the FollowMyHealth Patient Portal offered by [facility] by registering at the following website: [email or web address]. By joining [facility]’s FollowMyHealth portal, you will also be able to view your health information using other applications (apps) compatible with our system.

## 2023-03-17 NOTE — DISCHARGE NOTE PROVIDER - CARE PROVIDER_API CALL
Aye Morton)  Surgery  [address]  [place] [address]  Phone: [phone]  Fax: [phone]  Follow Up Time:

## 2023-03-17 NOTE — DISCHARGE NOTE NURSING/CASE MANAGEMENT/SOCIAL WORK - NURSING SECTION COMPLETE
Patient/Caregiver provided printed discharge information. tested COVID + last Thursday, on paxlovid day 1.5 presenting today with chest tightness/heaviness. pt called [facility] hotline and advised to come to ED for further evaluation. + fevers, body aches, cough, CP. tmax 102. Spo2 in triage 98%, no acute respiratory distress noted.

## 2023-03-17 NOTE — PROGRESS NOTE ADULT - ASSESSMENT
TOMMIELYLY is a 52y male with diverticulitis and microperforation without abscess. Afebrile, leukocytosis and pain improved.     PLAN  - NPO, advance to [facility]    - IVF   - Observe on IV abx   - pain control prn  - OOB/ambulate  - IS   
diverticulitis    npo  ivf  abs  observation
52M with diverticulitis and microperforation without abscess. VSS, pain improved, leukocytosis resolved.     PLAN  - CLD  - IVF  - Plan for repeat CT   - Continue Zosyn   - Appreciate ID recommendations  - DVT ppx  - OOB/ambulate  - Will discuss with Dr. Morton
LYLY REILLY is a 52yMale with sigmoid diverticulitis and microperforation without abscess. Afebrile, leukocytosis improved. CT yesterday with decreased inflammatory changes and no drainable fluid collection.     PLAN  - advance to low fiber diet   - IV abx  - Appreciate ID recommendations  - OOB/ambulate   - d/c planning

## 2023-03-17 NOTE — PROGRESS NOTE ADULT - SUBJECTIVE AND OBJECTIVE BOX
INTERVAL HPI/OVERNIGHT EVENTS:  Pt resting comfortably in bed. No acute complaints. Denies abd pain, nausea, vomiting, fevers, chills. CTAP yesterday revealed mild improvement of inflammation. tolerating FLD. Having flatus and BM.    MEDICATIONS  (STANDING):  dextrose 5% + sodium chloride 0.45% with potassium chloride 20 mEq/L 1000 milliLiter(s) (125 mL/Hr) IV Continuous <Continuous>  heparin   Injectable 5000 Unit(s) SubCutaneous every 8 hours  piperacillin/tazobactam IVPB.. 3.375 Gram(s) IV Intermittent every 8 hours    MEDICATIONS  (PRN):  morphine  - Injectable 2 milliGRAM(s) IV Push every 4 hours PRN Moderate Pain (4 - 6)  ondansetron Injectable 4 milliGRAM(s) IV Push every 6 hours PRN Nausea and/or Vomiting      Vital Signs Last 24 Hrs  T(C): 36.8 (17 Mar 2023 06:32), Max: 36.9 (16 Mar 2023 21:28)  T(F): 98.3 (17 Mar 2023 06:32), Max: 98.5 (16 Mar 2023 21:28)  HR: 50 (17 Mar 2023 06:32) (50 - 95)  BP: 118/62 (17 Mar 2023 06:32) (116/52 - 146/95)  BP(mean): --  RR: 18 (17 Mar 2023 06:32) (18 - 18)  SpO2: 97% (17 Mar 2023 06:32) (95% - 97%)    Parameters below as of 17 Mar 2023 06:32  Patient On (Oxygen Delivery Method): room air        Physical:  General: A&Ox3. NAD.  Respiratory: Normal respiratory effort. Equal chest rise bilaterally.  Abdomen: Soft, nondistended, nontender. No guarding. No rebound tenderness.     I&O's Detail    16 Mar 2023 07:01  -  17 Mar 2023 07:00  --------------------------------------------------------  IN:  Total IN: 0 mL    OUT:    Voided (mL): 675 mL  Total OUT: 675 mL    Total NET: -675 mL      LABS:                        14.5   6.76  )-----------( 331      ( 17 Mar 2023 06:18 )             42.5             03-17    136  |  104  |  10  ----------------------------<  91  3.8   |  24  |  1.20    Ca    9.4      17 Mar 2023 06:18      < from: CT Abdomen and Pelvis w/ Oral Cont and w/ IV Cont (03.16.23 @ 18:30) >    IMPRESSION:  Sigmoid diverticulitis with mild improvement/decrease of inflammatory   changes.  No drainable fluid collection  < end of copied text >

## 2023-03-17 NOTE — DISCHARGE NOTE NURSING/CASE MANAGEMENT/SOCIAL WORK - NSDCPEFALRISK_GEN_ALL_CORE
For information on Fall & Injury Prevention, visit: [email or web address] OR  [email or web address] OR  https://www.cdc.gov/steadi/patient.html

## 2023-03-17 NOTE — DISCHARGE NOTE PROVIDER - NSDCMRMEDTOKEN_GEN_ALL_CORE_FT
ciprofloxacin 500 mg oral tablet: 1 tab(s) orally 2 times a day   metroNIDAZOLE 500 mg oral tablet: 1 tab(s) orally 3 times a day     Do not drink alcohol when taking this medication.

## 2023-03-17 NOTE — DISCHARGE NOTE PROVIDER - HOSPITAL COURSE
52M who presented to [facility] with lower abdominal pain, CT showed sigmoid diverticulitis with microperforation without abscess. Admitted for conservative management of diverticulitis. patient was made NPO and started on IV abx. Patient remained afebrile with improved pain and leukocytosis during admission. Repeat CT scan on 3/16 showed improvement in sigmoid inflammation. Patient tolerated advancements in diet without nausea or vomiting and continued to have bowel function. By day of discharge the patient was tolerating a low fiber diet, voiding and having bowel function freely, ambulating at baseline, and their pain was controlled. They were deemed medically stable for discharge with PO abx and instructed to follow up with Dr. Morton in 1-2 weeks.

## 2023-03-17 NOTE — DISCHARGE NOTE PROVIDER - ATTENDING DISCHARGE PHYSICAL EXAMINATION:
Pt seen and examined. Denies any abdominal pain. Tolerating low fiber solid diet.    Afebrile, VSS. Abd- soft, non tender, non distended. No guarding, no rebound.  Stable for discharge to home with po antibiotics, cipro, flagyl. Discussed signs and symptoms to call for, recurrent pain, fever. Pt expressed understanding.